# Patient Record
Sex: MALE | Race: WHITE
[De-identification: names, ages, dates, MRNs, and addresses within clinical notes are randomized per-mention and may not be internally consistent; named-entity substitution may affect disease eponyms.]

---

## 2020-02-25 ENCOUNTER — NURSE TRIAGE (OUTPATIENT)
Dept: OTHER | Facility: CLINIC | Age: 25
End: 2020-02-25

## 2020-02-25 NOTE — TELEPHONE ENCOUNTER
Reason for Disposition   Using nasal washes and pain medicine > 24 hours and sinus pain persists    Answer Assessment - Initial Assessment Questions  1. ONSET: \"When did the cough begin? \"      Last Wednesday or Thursday    2. SEVERITY: \"How bad is the cough today? \"       Was sent home due to cough spell that sounded  Violent\", coughing up green/yellow mucus     3. RESPIRATORY DISTRESS: \"Describe your breathing. \"       SOB \"a little bit\"    4. FEVER: \"Do you have a fever? \" If so, ask: \"What is your temperature, how was it measured, and when did it start? \"    Denies      5. HEMOPTYSIS: \"Are you coughing up any blood? \" If so ask: \"How much? \" (flecks, streaks, tablespoons, etc.)      Denies     6. TREATMENT: \"What have you done so far to treat the cough? \" (e.g., meds, fluids, humidifier)      OTC meds and neti pot, nasal spray    7. CARDIAC HISTORY: \"Do you have any history of heart disease? \" (e.g., heart attack, congestive heart failure)      Denies     8. LUNG HISTORY: \"Do you have any history of lung disease? \"  (e.g., pulmonary embolus, asthma, emphysema)      Asthma     9. PE RISK FACTORS: \"Do you have a history of blood clots? \" (or: recent major surgery, recent prolonged travel, bedridden)      Denies     10. OTHER SYMPTOMS: \"Do you have any other symptoms? (e.g., runny nose, wheezing, chest pain)        Runny nose, sore throat    11. PREGNANCY: \"Is there any chance you are pregnant? \" \"When was your last menstrual period? \"        NA     12. TRAVEL: \"Have you traveled out of the country in the last month? \" (e.g., travel history, exposures)       NA    Protocols used: COUGH-ADULT-[place]      Pt called in stating that he has had a cough for about a week, sore throat, and congestion in head. Pt states he was sent home from work for an episode of a \"violent\" cough where he struggled to get the mucus up. Pt states mucus is a yellow/green color. Denies fever. Caller reports symptoms as documented above.   Caller